# Patient Record
Sex: MALE | Race: WHITE
[De-identification: names, ages, dates, MRNs, and addresses within clinical notes are randomized per-mention and may not be internally consistent; named-entity substitution may affect disease eponyms.]

---

## 2017-01-03 ENCOUNTER — HOSPITAL ENCOUNTER (INPATIENT)
Dept: HOSPITAL 36 - ER | Age: 57
LOS: 1 days | Discharge: HOME | DRG: 203 | End: 2017-01-04
Payer: MEDICAID

## 2017-01-03 DIAGNOSIS — F32.9: ICD-10-CM

## 2017-01-03 DIAGNOSIS — Z87.11: ICD-10-CM

## 2017-01-03 DIAGNOSIS — I10: ICD-10-CM

## 2017-01-03 DIAGNOSIS — K21.9: ICD-10-CM

## 2017-01-03 DIAGNOSIS — R07.89: Primary | ICD-10-CM

## 2017-01-03 LAB
ALBUMIN/GLOB SERPL: 1.6 {RATIO} (ref 1–1.8)
ALP SERPL-CCNC: 63 U/L (ref 34–104)
ALT SERPL-CCNC: 21 U/L (ref 7–52)
AMPHET UR-MCNC: NEGATIVE NG/ML
ANION GAP SERPL CALC-SCNC: 11.1 MMOL/L (ref 7–16)
AST SERPL-CCNC: 17 U/L (ref 13–39)
BARBITURATES UR-MCNC: NEGATIVE UG/ML
BASOPHILS NFR BLD AUTO: 0.5 % (ref 0–2)
BILIRUB SERPL-MCNC: 0.5 MG/DL (ref 0.3–1)
BILIRUB UR-MCNC: NEGATIVE MG/DL
BUN SERPL-MCNC: 20 MG/DL (ref 7–25)
BUN/CREAT SERPL: 15.4
CALCIUM SERPL-MCNC: 9.5 MG/DL (ref 8.6–10.3)
CHLORIDE SERPL-SCNC: 105 MEQ/L (ref 98–107)
CO2 SERPL-SCNC: 24.5 MEQ/L (ref 21–31)
COLOR UR: YELLOW
CREAT SERPL-MCNC: 1.3 MG/DL (ref 0.7–1.3)
EOSINOPHIL NFR BLD AUTO: 1.6 % (ref 0–5)
ERYTHROCYTE [DISTWIDTH] IN BLOOD BY AUTOMATED COUNT: 12.1 % (ref 11.5–20)
GLOBULIN SER-MCNC: 2.8 GM/DL
GLUCOSE SERPL-MCNC: 116 MG/DL (ref 70–105)
GLUCOSE UR STRIP-MCNC: NEGATIVE MG/DL
HCT VFR BLD CALC: 43.5 % (ref 39–49)
HGB BLD-MCNC: 14.7 GM/DL (ref 13.2–17.3)
KETONES UR STRIP-MCNC: NEGATIVE MG/DL
LYMPHOCYTES NFR BLD AUTO: 24.1 % (ref 20–50)
MCH RBC QN AUTO: 30.4 PG (ref 26–30)
MCHC RBC AUTO-ENTMCNC: 33.7 PG (ref 28–36)
MCV RBC AUTO: 90.2 FL (ref 80–99)
MONOCYTES NFR BLD AUTO: 9.8 % (ref 2–10)
NEUTROPHILS # BLD: 3.7 TH/CMM (ref 1.8–8)
NEUTROPHILS NFR BLD AUTO: 64 % (ref 40–80)
PH UR STRIP: 7 [PH]
PLATELET # BLD: 220 TH/CMM (ref 150–400)
PMV BLD AUTO: 8.4 FL
POTASSIUM SERPL-SCNC: 3.6 MEQ/L (ref 3.5–5.1)
PROT UR STRIP-MCNC: NEGATIVE MG/DL
RBC # BLD AUTO: 4.82 MIL/CMM (ref 4.3–5.7)
RBC # UR STRIP: NEGATIVE /UL
SODIUM SERPL-SCNC: 137 MEQ/L (ref 136–145)
UROBILINOGEN UR STRIP-ACNC: 0.2 E.U./DL (ref 0.2–1)
WBC # BLD AUTO: 5.6 TH/CMM (ref 4.8–10.8)

## 2017-01-03 NOTE — ED PHYSICIAN CHART
Chief Complaint/HPI





- Patient Information


Date Seen:: 01/03/17


Time Seen:: 09:27


Chief Complaint:: palpitations


History of Present Illness:: 





pt says since awoke today not feeling right.


has noted palpitations which he says he hasnt had before but defines as a 

awareness of heart beating which is unusual.  no cp.





feels not well w sob sensation at times and a feeling of lt headedness.





no froylan pmh but he did start lexapro about 3 weeks ago for depresion.  also is 

on omeprazole for reflux.





had a cardiac stress tst 5 yrs ago was ok.  strong fam hx of cad.





no leg edema of pain .





pt tried eating a banana and milk w/o improvement.





did drink etoh on new yrs but this is unusual and no drug or smoking hx.





pt feels like his BP has been high today...has some hx of untxd htn by our 

records although he has never admitted to me to have been under regular 

compliant tx for it.  He says he has not ever been txd for htn bc of insurance 

and financial reasons.





10;03a - pt now c/o more dizzy and lt headedness.  says he is intermittently 

worse w sob.   has had several seconds of chest pressure on left side as well.














Allergies:: 


 Allergies











Allergy/AdvReac Type Severity Reaction Status Date / Time


 


MDX No Known Allergies - Nka Allergy   Verified 02/02/13 21:32





[No Known Allergies - Nka]     











Vitals:: 


 Vital Signs - 8 hr











  01/03/17





  09:10


 


Temp 96 F


 





 


RR 16


 


/72


 


O2 Sat % 100











Historian:: Patient





Review of Systems





- Review of Systems


General/Constitutional: No fever, No chills, No weight loss, No weakness, No 

diaphoresis, No edema, No loss of appetite


Skin: No skin lesions, No rash, No bruising


Head: No headache, No light-headedness


Eyes: No loss of vision, No pain, No diplopia


ENT: No earache, No nasal drainage, No sore throat, No tinnitus


Neck: No neck pain, No swelling, No thyromegaly, No stiffness, No mass noted


Cardio Vascular: No chest pain, Palpitations, No PND, No orthopnea, No edema


Pulmonary: SOB (?), No SOB, No cough, No sputum, No wheezing


GI: No nausea, No vomiting, No diarrhea, No pain, No melena, No hematochezia, 

No constipation, No hematemesis


G/U: No dysuria, No frequency, No hematuria


Musculoskeletal: No bone or joint pain, No back pain, No muscle pain


Endocrine: No polyuria, No polydipsia


Psychiatric: No prior psych history, No depression, No anxiety, No suicidal 

ideation


Hematopoietic: No bruising, No lymphadenopathy


Allergic/Immuno: No urticaria, No angioedema


Neurological: No syncope, No focal symptoms, No weakness, No paresthesia, No 

headache, No seizure, Dizziness, No confusion, No vertigo





Past Medical History





- Past Medical History


Past Medical History: PUD/GERD


Social History: Non Smoker, No Alcohol, No Drug Use


Medication: Reviewed





Family Medical History





- Family Member


  ** Mother


History Unknown: Yes





Physical Exam





- Physical Examination


General/Constitutional: Awake, Well-developed, well-nourished, Alert, No 

distress, GCS 15, Non-toxic appearing, Ambulatory


Head: Atraumatic


Eyes: Lids, conjuctiva normal, PERRL, EOMI


Skin: Nl inspection, No rash, No skin lesions, No ecchymosis, Well hydrated, No 

lymphadenopathy


ENMT: External ears, nose nl, Nasal exam nl, Lips, teeth, gums nl


Neck: Nontender, Full ROM w/o pain, No JVD, No nuchal rigidity, No bruit, No 

mass, No stridor


Respiratory: Nl effort/Exclusion, Clear to Auscultation, No Wheeze/Rhonchi/Rales


Cardio Vascular: RRR, No murmur, gallop, rubs, NL S1 S2


GI: No tenderness/rebounding/guarding, No organomegaly, No hernia, Normal BS's, 

Nondistended, No mass/bruits, No McBurney tenderness


: No CVA tenderness


Extremities: No tenderness or effusion, Full ROM, normal strength in all 

extremities, No edema, Normal digits & nails


Other Extremities comments:: 





no edema. no homans sx.





Neuro/Psych: Alert/oriented, DTR's symmetric, Normal sensory exam, Normal motor 

strength, Judgement/insight normal, Mood normal, Normal gait, No focal deficits


Misc: normal gait, Normal back, No paraspinal tenderness





Labs/Radiology/EKG Results





- Lab Results


Results: 





 Laboratory Tests











  01/03/17 01/03/17 01/03/17





  09:40 09:40 09:40


 


WBC  5.6  


 


RBC  4.82  


 


Hgb  14.7  


 


Hct  43.5  


 


MCV  90.2  


 


MCH  30.4 H  


 


MCHC Differential  33.7  


 


RDW  12.1  


 


Plt Count  220  


 


MPV  8.4  


 


Neutrophils %  64.0  


 


Lymphocytes %  24.1  


 


Monocytes %  9.8  


 


Eosinophils %  1.6  


 


Basophils %  0.5  


 


D-Dimer   < 100 L 


 


Sodium   137 


 


Potassium   3.6 


 


Chloride   105 


 


Carbon Dioxide   24.5 


 


Anion Gap   11.1 


 


BUN   20 


 


Creatinine   1.3 


 


Est GFR ( Amer)   > 60.0 


 


Est GFR (Non-Af Amer)   > 60.0 


 


BUN/Creatinine Ratio   15.4 


 


Glucose   116 H 


 


Calcium   9.5 


 


Total Bilirubin   0.5 


 


AST   17 


 


ALT   21 


 


Alkaline Phosphatase   63 


 


Troponin I    < 0.01 L


 


Total Protein   7.3 


 


Albumin   4.5 


 


Globulin   2.8 


 


Albumin/Globulin Ratio   1.6 


 


Ethyl Alcohol   < 10 














- Radiology Results


Results: 





cxr nad





ct head nad/ no bleed.








- EKG Interpretations


EKG Time:: 09:25


Rhythm: nsr


Axis: -10


Rate: 102


Comments:: 





nrml st/t waves ...wnl





ED Septic Shock





- .


Is Septic Shock (SBP<90, OR Lactate>4 mmol\L) present?: No





- <6hrs of presentation:


Vital Signs: 


 Vital Signs - 8 hr











  01/03/17





  09:10


 


Temp 96 F


 





 


RR 16


 


/72


 


O2 Sat % 100














Reassessment (Disposition)





- Reassessment


Reassessment:: 





no ectopy seen on monitor.





feels better after metoprolol and ntg in ed.  


results and plan reviewed w pt and family...





rosette Garcia...will admit for cardiac rule out.


Reassessment Condition:: Improved





- Diagnosis


Diagnosis:: 





1 chest pain r/o unstable angina


2 htn - untreated


3 palpitations of unknown etiology





 





- Patient Disposition


Admitted to:: Telemetry


Condition at Disposition:: Improved

## 2017-01-03 NOTE — HISTORY & PHYSICAL
REASON FOR ADMISSION:  Chest pain.



HISTORY OF PRESENT ILLNESS:  A 56-year-old gentleman recently diagnosed with

chronic major depression, started on Lexapro 5 mg once a day on 12/15/2016. 

Since then, he is having some difficulties taking it, so recently decreased the

dose to 2.5 mg once a day on Lexapro medication.  This morning, the patient woke

up and took a bus to the work, not feeling well, felt some palpitations,

shortness of breath associated with it, and uneasy, so he came back to the house

and walk to the Emergency Room as there was no one at home.  The patient says 1

to 2 seconds pain in the left fourth intercostal space also happens few times,

some palpitation kind of sensation when overall not feeling well.  The patient

in the Emergency Room received IV metoprolol 5 mg as his blood pressure was

running around 133/98 to 149/91 range.  After receiving metoprolol, he started

feeling much better.  He denies any headache, vision problems, swallowing

problem.  No URI symptomatology.  No fever or chills.  Denies passing out. 

Denies any spotting.  No gastroesophageal reflux disease symptomatology.  No

abdominal pain, nausea, vomiting, diarrhea, or melena.  No UTI symptomatology. 

No hematuria.  No leg swelling or joint swelling.



PAST MEDICAL HISTORY:  Chronic major depression.



MEDICATIONS:  Lexapro 5 mg once a day, started from 12/15/2016.  The patient

occasionally takes over-the-counter Prilosec.



ALLERGIES:  None.



SOCIAL HISTORY:  Never smoked, no alcohol or substance abuse.



FAMILY HISTORY:  Noncontributory.



REVIEW OF SYSTEMS:  See history of presenting illness.



PHYSICAL EXAMINATION:

VITAL SIGNS:  Height is 1.88 meter, weight 124.7 kg, BMI 35.3, temperature 97.8,

pulse 79, respiratory rate 18, blood pressure 120/79.  As mentioned earlier,

highest was 133/98 to 149/91 and saturation on room air 99%.

HEENT:  Unremarkable.  No icterus, no pallor.  No petechiae.  No oral

candidiasis.

NECK:  Supple.  No JVD, bruit, or lymphadenopathy.

LUNGS:  Clear.  No rales or rhonchi.

CARDIOVASCULAR:  S1, S2 normal limits.  No murmur, gallop or bruit.

ABDOMEN:  Soft, nontender.  No hepatosplenomegaly.  Bowel sounds active in all

quadrants.

EXTREMITIES:  Dorsalis pedis palpable.

CENTRAL NERVOUS SYSTEM:  Cranial nerves intact, nonfocal.

MUSCULOSKELETAL:  No clubbing, cyanosis, or synovitis.



LABORATORY TESTS:  EKG normal sinus rhythm, nonspecific ST-T changes noted.  WBC

5.6, hemoglobin 14.7, MCV 90, platelet of ____, neutrophil 64.  D-dimer is

negative.  Troponin also negative.  Sodium 137, potassium 3.6, chloride 105,

bicarbonate 24, BUN 20, creatinine 1.3, glucose of 116 and a calcium of 9.5. 

Liver panel is unremarkable with albumin of 4.5.  Serum alcohol level is

negative.  Chest x-ray unremarkable.  CT head was unremarkable.



ASSESSMENT AND PLAN:

1.  Chest pain, most likely noncardiac due to the risk factor. We will admit the

patient, rule out acute myocardial infarction.  We will obtain echo, EKG,

troponin q.8 x 3 and a lipid panel in the morning and a Cardiology consult with

Dr. VISHNU Garcia.

2.  Hypertension, new onset.  We will give the patient metoprolol tartrate 25 mg

twice a day.

3.  Gastroesophageal reflux disease.  We will continue with Pepcid 20 b.i.d.

4.  Chronic major depression.  We will continue with Lexapro 5 mg once a day.





DD: 01/03/2017 13:23

DT: 01/03/2017 14:29

[facility]# [id number]  [id number]

## 2017-01-03 NOTE — DIAGNOSTIC IMAGING REPORT
CT scan of the brain without intravenous contrast



HISTORY: Dizziness



Total DLP equals 673



CTDI equals 37.6



Axial sections were obtained from the base of the skull to the vertex.



There is a normal ventricular system size. There is prominence of

cerebral sulci and subarachnoid cisterns reflecting mild generalized

cerebral atrophy. No acute parenchymal abnormalities. No intracerebral

hemorrhage. No mass effect or shift of midline structures. No

extra-axial masses or abnormal fluid collections.



IMPRESSION:

1. No acute abnormalities

2. Mild generalized cerebral atrophy

## 2017-01-03 NOTE — DIAGNOSTIC IMAGING REPORT
Portable chest x-ray



History: Pain



Allowing for portable technique the heart size is normal. No focal

pulmonary parenchymal processes. No hilar or mediastinal abnormalities.



Impression: No acute abnormalities.

## 2017-01-03 NOTE — ADMIT CRITERIA FORM
Admit Criteria Forms





- Admit Criteria


Diagnosis: 





                                           TELEMETRY CARE





Telemetry Admission Guidelines





                                                             (Place 'X' for any 

and all applicable criteria):





Admission to telemetry [A] may be indicated for ANY ONE of the following(1)(2)(3

)(4)(5):


[X ]I.    Cardiac disease, including ANY ONE  of the following (9)(10)(11)(12)(

13):   


         [ ]a)   Postacute MI


         [ ]b)   Low-risk patients with ST-segment elevation  MI who have 

undergone successful percutaneous coronary intervention


         [ X]c)   Unstable angina             


         [ ]d)   Suspected MI (until it is ruled out)


         [ ]e)   Post cardiac  surgery (first 48 to 72 hours unless 

complications occur)


         [ ]f)    Acute arrhythmias (including significant tachycardia or 

bradycardia) [B]


         [ ]g)   Firing of an implantable cardioverter defibrillator [C]


         [ ]h)   Suspected pacemaker or implantable cardioverter defibrillator 

malfunction (10)


         [ ]i)    New administration or adjustment of an antiarrhythmic drug [D

] 


         [ ]j)    Child admitted for acute congestive heart failure            

  


         [ ]j)    Long QT syndrome 


         [ ]k)   Advanced heart block (eg, second-degree Mobitz  type II, third-

degree heart block)


         [ ]l)    Acute myocarditis or pericarditis


         [ ]m)  Short-term (ambulatory or inpatient) monitoring after a cardiac

  procedure as indicated  by ANY ONE of  the following [E]:


                  [ ]i)     Electrophysiologic studies                          


                  [ ]ii)    Percutaneous coronary  intervention with stent 

placement


                  [ ]iii)   Pacemaker placement with cardiac  conduction defect 


                  [ ]iv)   Implantable cardiac  defibrillator placement


[ ]II.   Drug overdose  or poisoning with substance that causes arrhythmias or 

QT prolongation (eg, phenothiazines, sympathomimetic agents, 


         cyclic antidepressants, digitalis, antiarrhythmic drugs)(15)


[ ]III.  Short-term (ambulatory or inpatient) monitoring after therapeutic or 

diagnostic procedure requiring 


         conscious sedation or anesthesia (eg, endoscopy, elective   

cardioversion)


[ ]IV.  Acute cerebrovascular even[F](18)


[ ]V.   Massive blood transfusion (eg, at least 10 units of packed red blood 

cells in 24 hours)


[ ]VI.  Variceal bleeding after endoscopy, sclerotherapy, or IV vasopressin


[ ]VII. Uncorrected electrolyte  abnormalities associated with an increased  

risk of dangerous arrhythmia [G]; examples include


         [ ]a)   Hyperkalemia with attributable ECG changes


         [ ]b)   Potassium greater  than 6.5 mmol/L  (mEq/L) in a patient  

without  history of chronic  renal disease 


         [ ]c)   Prolonged QT attributed to hypokalemia,   hypomagnesemia, or 

hypocalcemia


[ ]VIII.Unexplained syncope  or other neurologic event suspected of being due 

to arrhythmia due to a finding that increases risk; 


        examples include(19)(20)(21):


        [ ]a)   High-risk ECG findings (eg, bifascicular block, bradycardia, 

abnormal QT interval,  ventricular pre- excitation)


        [ ]b)   History of previous  syncope  due to arrhythmia


        [ ]c)   Abnormal ventricular function  (eg, reduced  ejection  fraction

)      


        [ ]d)   Exertional or supine syncope


        [ ]e)   Concerning syncope  characteristics (eg, sudden loss of 

consciousness without  prodrome)


        [ ]f)    Family history of sudden  death


        [ ]g)   Use of arrhythmogenic medication          


        [ ]h)   Suspected cardiac  ischemia


        [ ]i)    Known channelopathy (eg, long QT syndrome, Brugada syndrome, 

or catecholaminergic paroxysmal ventricular tachycardia)


        [ ]j)    Known structural heart disease (eg, hypertrophic cardiomyopathy

, severe valvular disease)


        [ ]k)   Palpitations preceding syncope











The original [employer] content created by [employer] has been revised. 


The portions of  the content which have been revised are identified through the 

use of italic text or in bold, and [employer] 


has neither reviewed nor approved the modified material. All other unmodified 

content is copyright  [employer].





Please see references footnoted in the original [employer] edition 

2016

## 2017-01-04 LAB
ALBUMIN/GLOB SERPL: 1.6 {RATIO} (ref 1–1.8)
ALP SERPL-CCNC: 54 U/L (ref 34–104)
ALT SERPL-CCNC: 19 U/L (ref 7–52)
AMYLASE SERPL-CCNC: 32 U/L (ref 29–103)
ANION GAP SERPL CALC-SCNC: 11 MMOL/L (ref 7–16)
AST SERPL-CCNC: 14 U/L (ref 13–39)
BASOPHILS NFR BLD AUTO: 1 % (ref 0–2)
BILIRUB SERPL-MCNC: 0.5 MG/DL (ref 0.3–1)
BUN SERPL-MCNC: 20 MG/DL (ref 7–25)
BUN/CREAT SERPL: 15.4
CALCIUM SERPL-MCNC: 9.3 MG/DL (ref 8.6–10.3)
CHLORIDE SERPL-SCNC: 105 MEQ/L (ref 98–107)
CHOLEST SERPL-MCNC: 197 MG/DL (ref ?–200)
CO2 SERPL-SCNC: 28 MEQ/L (ref 21–31)
CREAT SERPL-MCNC: 1.3 MG/DL (ref 0.7–1.3)
EOSINOPHIL NFR BLD AUTO: 2.4 % (ref 0–5)
ERYTHROCYTE [DISTWIDTH] IN BLOOD BY AUTOMATED COUNT: 12 % (ref 11.5–20)
GLOBULIN SER-MCNC: 2.6 GM/DL
GLUCOSE SERPL-MCNC: 107 MG/DL (ref 70–105)
HCT VFR BLD CALC: 41.7 % (ref 39–49)
HGB BLD-MCNC: 14.2 GM/DL (ref 13.2–17.3)
LYMPHOCYTES NFR BLD AUTO: 33.3 % (ref 20–50)
MCH RBC QN AUTO: 30.6 PG (ref 26–30)
MCHC RBC AUTO-ENTMCNC: 34.2 PG (ref 28–36)
MCV RBC AUTO: 89.4 FL (ref 80–99)
MONOCYTES NFR BLD AUTO: 12.2 % (ref 2–10)
NEUTROPHILS # BLD: 2.7 TH/CMM (ref 1.8–8)
NEUTROPHILS NFR BLD AUTO: 51.1 % (ref 40–80)
PLATELET # BLD: 205 TH/CMM (ref 150–400)
PMV BLD AUTO: 8.3 FL
POTASSIUM SERPL-SCNC: 4 MEQ/L (ref 3.5–5.1)
RBC # BLD AUTO: 4.66 MIL/CMM (ref 4.3–5.7)
SODIUM SERPL-SCNC: 140 MEQ/L (ref 136–145)
TRIGL SERPL-MCNC: 190 MG/DL (ref ?–150)
URATE SERPL-MCNC: 7.9 MG/DL (ref 4.4–7.6)
WBC # BLD AUTO: 5.3 TH/CMM (ref 4.8–10.8)

## 2017-01-04 NOTE — DISCHARGE SUMMARY
FINAL DIAGNOSES:

1. Chest pain, atypical myocardial infarction, ruled out.

2. Hypertension, new onset, started on metoprolol 25 b.i.d. and currently

stable.

3. Chronic major depression diagnosed on 12/15/2016, on Lexapro 5 mg.  The

patient is taking 2.5 mg.  I advised the patient to go back to 5 mg once a day.

4. Palpitation, workup so far negative.  Echocardiogram is pending, but

preliminary report from the Echo is negative.

5. Impaired fasting blood sugar.  Advised the patient to decrease weight.

6. Exogenous obesity.  BMI of 35.3.  Advised the patient to decrease weight and

follow the diet.

7. Hyperuricemia, mild at 7.9.  Advised the patient to eat protein 60-70 g per

day.

8. Hypertriglyceridemia.  Advised the patient to take Omega-3 fish oil and avoid

saturated fat in diet.

9. Gastroesophageal reflux disease, currently the patient is on omeprazole 20 mg

once a day and advised small meals, avoid food after 6 p.m., avoid sour food and

fatty food, last portion spicy food, caffeine, chocolate, sodas, large quantity 
of food consumption at time and onion.

10. Postnasal drip.  Advised the patient to use saline mist and over-the-counter

Claritin if required.



HOSPITAL COURSE AND IMPORTANT LABS:  This is a 56-year-old gentleman presented

through the Emergency Room not feeling right.  The patient also noted some

palpitations and heart is beating irregularly or not in correct rhythm.  The

patient had some chest pain, which lasted only a few seconds on the left side of

the fourth intercostal space.  The patient also feels that he gets dizzy very

easily.  He denies any tinnitus or hearing loss.  The patient does have

postnasal drip, which is getting worse recently and severe gastroesophageal

reflux disease.  On 12/15/2016, the patient was diagnosed with chronic major

depression and started on Lexapro 5 mg, but the patient was not feeling good, so

decreased by himself to 2.5 mg and the patient also sometimes has headaches so

in the Emergency Room the patient had a CT head, which was unremarkable.  Chest

x-rays also unremarkable.  EKG unremarkable.  Troponin negative due to the

multitude of symptoms without clear diagnoses.  The patient decided to be

admitted to the telemetry bed.  So far no rhythum disturbance noted on 
telemetry bed, serial EKG

is really unremarkable.  Troponin x4 serially is normal.  Echocardiogram

preliminary exam is negative.  Awaiting Cardiology input at present time. 

Workup so far significant for fasting blood sugar 107, uric acid 7.9, and

triglyceride 190.  All other workup is unremarkable.  Troponin x4 is negative. 

TSH is 2.62, LDL is 128, and HDL 36.  Cholesterol is 197.  WBC 5.3, hemoglobin

14.2, MCV 89, and platelet 205,000, sodium 140, potassium 4.0, chloride 105,

bicarbonate 28, BUN 20, and creatinine 1.3.  Liver panel is unremarkable. 

 Urinalysis negative.  Urine tox screen negative.  Serum alcohol level negative.



Advised the patient to decrease BMI by walking and watching diet and following

with primary care physician, repeating the hemoglobin A1c, fasting blood sugar,

lipid panel, and uric acid level in the next 3 months.



DISCHARGE PRESCRIPTION:  Lexapro 5 mg once a day, omeprazole 20 once a day, and

metoprolol 25 b.i.d.



DISCHARGE INSTRUCTIONS:  Follow up with primary care physician in 1 week.





DD: 01/04/2017 09:36

DT: 01/04/2017 19:52

[facility]# [id number]  [id number]

REESE

## 2017-01-04 NOTE — CONSULTATION
Patient of Dr. David Garcia.



HISTORY AND PHYSICAL:  This 56-year-old obese male patient who has major

depression.  The patient simply decreased his Lexapro.  The patient was

traveling in the bus, at which time the patient developed some uneasiness in the

chest, palpitation and shortness of breath.  Following this, the patient came to

the Emergency Room.  The patient's blood pressure was elevated, 143/100.  The

patient was given some medication and the patient's blood pressure improved. 

Following this, the patient was admitted and Cardiology consult was requested.



PAST MEDICAL HISTORY:  Major depression, obesity, GERD, hypertension.



FAMILY HISTORY:  Unremarkable.



SOCIAL HISTORY:  No history of smoking or alcohol abuse.



ALLERGIES:  None.



PHYSICAL EXAMINATION:

VITAL SIGNS:  Blood pressure 150/90, pulse 70, respirations 20.

HEAD:  Normocephalic.  No lumps or bumps.

EYES:  Pupils equal, reactive to light.  Fundi show AV nicking.  Sclerae white. 

Conjunctivae pink.

NECK:  Carotid 2+.  Normal upstroke.  JVD flat.  Thyroid not palpable.  Lymph

nodes not palpable.

CHEST:  Shows increased AP diameter.  No kyphosis, scoliosis.

LUNGS:  Bilateral bronchovesicular breath sounds.  Occasional wheeze.  No rales.

HEART:  PMI is in fifth intercostal space with lateral to midclavicular line. 

S1, S2, S3, S4.  Systolic murmur grade 2/6 in lower left sternal border without

radiation.

ABDOMEN:  Soft.  Liver, spleen not palpable.  No organomegaly.  Bowel sounds are

active.

NEUROLOGIC:  Unremarkable.

EXTREMITIES:  Peripheral pulses 2+.  No pedal edema.



DIAGNOSTIC DATA:  EKG showed normal sinus rhythm.  Echocardiogram showed

hypertrophy of the left ventricle, trace tricuspid regurgitation, ejection

fraction 55%.



CONCLUSION:  Chest pain atypical, we will get troponin levels; hypertension;

gastroesophageal reflux disease; major depression; obesity.



PLAN:  The patient to get echocardiogram.  Troponin level if negative, the

patient can be discharged since the chest pain is atypical.





DD: 01/04/2017 13:33

DT: 01/04/2017 23:18

JOB# [id number]  [id number]

## 2017-01-04 NOTE — CARDIOLOGY
Patient of Dr.Vijay Garcia.



M-MODE ECHOCARDIOGRAM:  Mitral valve, anterior leaflets of mitral valve shows

normal excursion, EF velocity.  Posterior leaflet of the mitral valve shows

normal excursion.  Rest of the M-mode echo technically poor.



2D ECHO:  Only structure visualized in apical 4-chamber view, which showed

normal sized left ventricle with hypertrophy of the left ventricle, ejection

fraction 55%.  Left atrium normal.  Right ventricular cavity, right atrium

normal.  No pericardial effusion.



CONCLUSION:  Technically poor echo with hypertrophy of the left ventricle,

ejection fraction 55%.  Doppler study showed trace tricuspid regurgitation.





DD: 01/04/2017 14:30

DT: 01/04/2017 17:06

JOB# [id number]  [id number]

## 2019-06-09 ENCOUNTER — HOSPITAL ENCOUNTER (EMERGENCY)
Dept: HOSPITAL 36 - ER | Age: 59
Discharge: HOME | End: 2019-06-09
Payer: SELF-PAY

## 2019-06-09 DIAGNOSIS — R06.02: ICD-10-CM

## 2019-06-09 DIAGNOSIS — R07.89: ICD-10-CM

## 2019-06-09 DIAGNOSIS — E78.5: ICD-10-CM

## 2019-06-09 DIAGNOSIS — I10: Primary | ICD-10-CM

## 2019-06-09 LAB
ALBUMIN SERPL-MCNC: 4.1 GM/DL (ref 4.2–5.5)
ALBUMIN/GLOB SERPL: 1.9 {RATIO} (ref 1–1.8)
ALP SERPL-CCNC: 59 U/L (ref 34–104)
ALT SERPL-CCNC: 18 U/L (ref 7–52)
ANION GAP SERPL CALC-SCNC: 12.4 MMOL/L (ref 7–16)
APPEARANCE UR: CLEAR
AST SERPL-CCNC: 15 U/L (ref 13–39)
BACTERIA #/AREA URNS HPF: (no result) /HPF
BASOPHILS # BLD AUTO: 0.1 TH/CUMM (ref 0–0.2)
BASOPHILS NFR BLD AUTO: 1 % (ref 0–2)
BILIRUB SERPL-MCNC: 0.3 MG/DL (ref 0.3–1)
BILIRUB UR-MCNC: NEGATIVE MG/DL
BUN SERPL-MCNC: 17 MG/DL (ref 7–25)
CALCIUM SERPL-MCNC: 8.9 MG/DL (ref 8.6–10.3)
CHLORIDE SERPL-SCNC: 106 MEQ/L (ref 98–107)
CK SERPL-CCNC: 166 U/L (ref 30–223)
CO2 SERPL-SCNC: 25 MEQ/L (ref 21–31)
COLOR UR: YELLOW
CREAT SERPL-MCNC: 1.3 MG/DL (ref 0.7–1.3)
EOSINOPHIL # BLD AUTO: 0.1 TH/CMM (ref 0.1–0.4)
EOSINOPHIL NFR BLD AUTO: 2.3 % (ref 0–5)
EPI CELLS URNS QL MICRO: (no result) /LPF
ERYTHROCYTE [DISTWIDTH] IN BLOOD BY AUTOMATED COUNT: 12.6 % (ref 11.5–20)
GLOBULIN SER-MCNC: 2.2 GM/DL
GLUCOSE SERPL-MCNC: 134 MG/DL (ref 70–105)
GLUCOSE UR STRIP-MCNC: NEGATIVE MG/DL
HCT VFR BLD CALC: 40.4 % (ref 41–60)
HGB BLD-MCNC: 13.4 GM/DL (ref 12–16)
KETONES UR STRIP-MCNC: NEGATIVE MG/DL
LEUKOCYTE ESTERASE UR-ACNC: NEGATIVE
LYMPHOCYTE AB SER FC-ACNC: 1.8 TH/CMM (ref 1.5–3)
LYMPHOCYTES NFR BLD AUTO: 35.5 % (ref 20–50)
MCH RBC QN AUTO: 30.2 PG (ref 26–30)
MCHC RBC AUTO-ENTMCNC: 33.3 PG (ref 28–36)
MCV RBC AUTO: 90.8 FL (ref 80–99)
MICRO URNS: YES
MONOCYTES # BLD AUTO: 0.5 TH/CMM (ref 0.3–1)
MONOCYTES NFR BLD AUTO: 9.9 % (ref 2–10)
NEUTROPHILS # BLD: 2.7 TH/CMM (ref 1.8–8)
NEUTROPHILS NFR BLD AUTO: 51.3 % (ref 40–80)
NITRITE UR QL STRIP: NEGATIVE
PH UR STRIP: 6 [PH] (ref 4.6–8)
PLATELET # BLD: 263 TH/CMM (ref 150–400)
POTASSIUM SERPL-SCNC: 3.4 MEQ/L (ref 3.5–5.1)
PROT UR STRIP-MCNC: NEGATIVE MG/DL
RBC # BLD AUTO: 4.45 MIL/CMM (ref 4.3–5.7)
RBC # UR STRIP: NEGATIVE /UL
RBC #/AREA URNS HPF: (no result) /HPF (ref 0–5)
SODIUM SERPL-SCNC: 140 MEQ/L (ref 136–145)
URINALYSIS COMPLETE PNL UR: (no result)
UROBILINOGEN UR STRIP-ACNC: 1 E.U./DL (ref 0.2–1)
WBC # BLD AUTO: 5.2 TH/CMM (ref 4.8–10.8)
WBC #/AREA URNS HPF: (no result) /HPF (ref 0–5)

## 2019-06-09 NOTE — ED PHYSICIAN CHART
ED Chief Complaint/HPI





- Patient Information


Date Seen:: 19


Time Seen:: 20:20


Chief Complaint:: dizziness chest pressure


History of Present Illness:: 





59 yr old male who was not able to get his bp meds due to lack of funds he has 

gotten it since then the metoprolol 25 mg po bid but still bp still up to 150 

to160 and still feeling not well with dizziness and sob


Allergies:: 


 Allergies











Allergy/AdvReac Type Severity Reaction Status Date / Time


 


No Known Allergies Allergy   Verified 17 09:44











Vitals:: 


 Vital Signs - 8 hr











  19





  20:00


 


Temp 97.4 F


 


HR 87


 


RR 18


 


/94


 


O2 Sat % 98














ED Review of Systems





- Review of Systems


General/Constitutional: No fever, No chills, No weight loss, No weakness, No 

diaphoresis, No edema, No loss of appetite


Skin: No skin lesions, No rash, No bruising


Head: No headache, No light-headedness


Eyes: No loss of vision, No pain, No diplopia


ENT: No earache, No nasal drainage, No sore throat, No tinnitus


Neck: No neck pain, No swelling, No thyromegaly, No stiffness, No mass noted


Cardio Vascular: Chest pain


Pulmonary: SOB


GI: No nausea, No vomiting, No diarrhea, No pain, No melena, No hematochezia, 

No constipation, No hematemesis


G/U: No dysuria, No frequency, No hematuria


Musculoskeletal: No bone or joint pain, No back pain, No muscle pain


Endocrine: No polyuria, No polydipsia


Psychiatric: No prior psych history, No depression, No anxiety, No suicidal 

ideation


Hematopoietic: No bruising, No lymphadenopathy


Allergic/Immuno: No urticaria, No angioedema


Neurological: Dizziness





ED Past Medical History





- Past Medical History


Past Medical History: HTN, Dyslipidemia





Family Medical History





- Family Member


  ** Mother


History Unknown: Yes


Living Status: 


Hx Family Hypertension: Yes


Hx Family Stroke: Yes





ED Physical Exam





- Physical Examination


General/Constitutional: Awake, Well-developed, well-nourished, Alert, No 

distress, GCS 15, Non-toxic appearing, Ambulatory


Head: Atraumatic


Eyes: Lids, conjuctiva normal, PERRL, EOMI


Skin: Nl inspection, No rash, No skin lesions, No ecchymosis, Well hydrated, No 

lymphadenopathy


ENMT: External ears, nose nl, Nasal exam nl, Lips, teeth, gums nl


Neck: Nontender, Full ROM w/o pain, No JVD, No nuchal rigidity, No bruit, No 

mass, No stridor


Respiratory: Nl effort/Exclusion, Clear to Auscultation, No Wheeze/Rhonchi/Rales


Cardio Vascular: RRR, No murmur, gallop, rubs, NL S1 S2


GI: No tenderness/rebounding/guarding, No organomegaly, No hernia, Normal BS's, 

Nondistended, No mass/bruits, No McBurney tenderness


: No CVA tenderness


Extremities: No tenderness or effusion, Full ROM, normal strength in all 

extremities, No edema, Normal digits & nails


Neuro/Psych: Alert/oriented, DTR's symmetric, Normal sensory exam, Normal motor 

strength, Judgement/insight normal, Mood normal, Normal gait, No focal deficits


Misc: Normal back, No paraspinal tenderness





ED Assessment





- Assessment


General Assessment: 





sob cp htn out of control





ED Septic Shock





- .


Is Septic Shock (SBP<90, OR Lactate>4 mmol\L) present?: No





- <6hrs of presentation:


Vital Signs: 


 Vital Signs - 8 hr











  19





  20:00


 


Temp 97.4 F


 


HR 87


 


RR 18


 


/94


 


O2 Sat % 98














ED Reassessment (Disposition)





- Reassessment


Reassessment:: 





cp sob elevated bp





- Diagnosis


Diagnosis:: 





as above





- Patient Disposition


Discharge/Transfer:: Home


Condition at Disposition:: Stable